# Patient Record
Sex: FEMALE | Race: OTHER | NOT HISPANIC OR LATINO | URBAN - METROPOLITAN AREA
[De-identification: names, ages, dates, MRNs, and addresses within clinical notes are randomized per-mention and may not be internally consistent; named-entity substitution may affect disease eponyms.]

---

## 2019-03-27 PROBLEM — Z00.00 ENCOUNTER FOR PREVENTIVE HEALTH EXAMINATION: Status: ACTIVE | Noted: 2019-03-27

## 2019-04-05 ENCOUNTER — OUTPATIENT (OUTPATIENT)
Dept: OUTPATIENT SERVICES | Facility: HOSPITAL | Age: 49
LOS: 1 days | End: 2019-04-05
Payer: COMMERCIAL

## 2019-04-05 ENCOUNTER — APPOINTMENT (OUTPATIENT)
Dept: MRI IMAGING | Facility: CLINIC | Age: 49
End: 2019-04-05

## 2019-04-05 PROCEDURE — 70553 MRI BRAIN STEM W/O & W/DYE: CPT | Mod: 26

## 2019-04-05 PROCEDURE — 70544 MR ANGIOGRAPHY HEAD W/O DYE: CPT | Mod: 26,59

## 2019-04-24 PROBLEM — Z00.00 ENCOUNTER FOR PREVENTIVE HEALTH EXAMINATION: Noted: 2019-04-24

## 2019-04-26 ENCOUNTER — OUTPATIENT (OUTPATIENT)
Dept: OUTPATIENT SERVICES | Facility: HOSPITAL | Age: 49
LOS: 1 days | End: 2019-04-26

## 2019-04-26 ENCOUNTER — APPOINTMENT (OUTPATIENT)
Dept: MRI IMAGING | Facility: CLINIC | Age: 49
End: 2019-04-26
Payer: COMMERCIAL

## 2019-04-26 PROCEDURE — 70547 MR ANGIOGRAPHY NECK W/O DYE: CPT | Mod: 26

## 2019-05-17 ENCOUNTER — OUTPATIENT (OUTPATIENT)
Dept: OUTPATIENT SERVICES | Facility: HOSPITAL | Age: 49
LOS: 1 days | End: 2019-05-17

## 2019-05-17 ENCOUNTER — APPOINTMENT (OUTPATIENT)
Dept: CT IMAGING | Facility: CLINIC | Age: 49
End: 2019-05-17
Payer: COMMERCIAL

## 2019-05-17 PROCEDURE — 70498 CT ANGIOGRAPHY NECK: CPT | Mod: 26

## 2019-06-11 ENCOUNTER — APPOINTMENT (OUTPATIENT)
Dept: NEUROSURGERY | Facility: CLINIC | Age: 49
End: 2019-06-11
Payer: COMMERCIAL

## 2019-06-11 VITALS
OXYGEN SATURATION: 99 % | RESPIRATION RATE: 18 BRPM | DIASTOLIC BLOOD PRESSURE: 77 MMHG | SYSTOLIC BLOOD PRESSURE: 111 MMHG | BODY MASS INDEX: 32.78 KG/M2 | WEIGHT: 192 LBS | HEART RATE: 63 BPM | HEIGHT: 64 IN

## 2019-06-11 DIAGNOSIS — Z86.69 PERSONAL HISTORY OF OTHER DISEASES OF THE NERVOUS SYSTEM AND SENSE ORGANS: ICD-10-CM

## 2019-06-11 DIAGNOSIS — F41.9 ANXIETY DISORDER, UNSPECIFIED: ICD-10-CM

## 2019-06-11 DIAGNOSIS — Z83.3 FAMILY HISTORY OF DIABETES MELLITUS: ICD-10-CM

## 2019-06-11 DIAGNOSIS — Q27.9 CONGENITAL MALFORMATION OF PERIPHERAL VASCULAR SYSTEM, UNSPECIFIED: ICD-10-CM

## 2019-06-11 DIAGNOSIS — Z78.9 OTHER SPECIFIED HEALTH STATUS: ICD-10-CM

## 2019-06-11 DIAGNOSIS — Z82.49 FAMILY HISTORY OF ISCHEMIC HEART DISEASE AND OTHER DISEASES OF THE CIRCULATORY SYSTEM: ICD-10-CM

## 2019-06-11 DIAGNOSIS — F32.9 ANXIETY DISORDER, UNSPECIFIED: ICD-10-CM

## 2019-06-11 DIAGNOSIS — Z86.39 PERSONAL HISTORY OF OTHER ENDOCRINE, NUTRITIONAL AND METABOLIC DISEASE: ICD-10-CM

## 2019-06-11 PROCEDURE — 99204 OFFICE O/P NEW MOD 45 MIN: CPT

## 2019-06-11 RX ORDER — TOPIRAMATE 25 MG/1
25 TABLET, COATED ORAL
Refills: 0 | Status: ACTIVE | COMMUNITY

## 2019-06-11 RX ORDER — ATORVASTATIN CALCIUM 10 MG/1
10 TABLET, FILM COATED ORAL
Refills: 0 | Status: ACTIVE | COMMUNITY

## 2019-06-11 RX ORDER — METFORMIN HYDROCHLORIDE 500 MG/1
500 TABLET, FILM COATED, EXTENDED RELEASE ORAL
Refills: 0 | Status: ACTIVE | COMMUNITY

## 2019-06-11 RX ORDER — LIRAGLUTIDE 6 MG/ML
INJECTION, SOLUTION SUBCUTANEOUS
Refills: 0 | Status: ACTIVE | COMMUNITY

## 2019-06-11 NOTE — HISTORY OF PRESENT ILLNESS
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 She experience vertigo once in February that lasted for 2 weeks. She also complains of RIGHT intermittent periorbital HA that is managed by her neurologist with Topamax. CTA mentions of the right VA showing a fusiform dilatation and ectasia at the v3 segment which is nonspecific. \par \par She denies recent neck/head trauma, focal weakness, numbness/tingling, unsteady gait. She denies severe new onset headache and family history of brain aneurysm\par \par Handedness: RIGHT\par \par Patient Address:\par [address], [address]\[place] [address]\par \par Referring MD:\par Dr. Ike Peoples\par [address]\[place] [address]\par [phone]\par \par PCP:\par Dr. Anjelica Ascencio\par [address], [address]\par [facility] [address]\par [phone]\par \par \par PCP:

## 2019-06-11 NOTE — REASON FOR VISIT
[New Patient Visit] : a new patient visit [Referred By: _________] : Patient was referred by MACARIO [FreeTextEntry1] : to evaluate aplastic LEFT vertebral artery

## 2019-06-11 NOTE — REVIEW OF SYSTEMS
[Anxiety] : anxiety [As Noted in HPI] : as noted in HPI [Feeling Tired] : feeling tired [Negative] : Musculoskeletal

## 2019-06-11 NOTE — DATA REVIEWED
[de-identified] : \par EXAM: MR ANGIO NECK \par \par PROCEDURE DATE: 04/26/2019 \par \par \par \par \par INTERPRETATION: PROCEDURE: MRA neck without contrast \par \par INDICATION: Vascular headache, vertigo. Abnormal left vertebral artery. \par \par TECHNIQUE: The MRA of the neck was performed utilizing both 2-D and 3-D TOF \par technique. MIP series are provided. \par \par COMPARISON: MRA brain 04/05/2019 \par \par FINDINGS: The left vertebral artery is not identified med may be markedly \par hypoplastic, congenitally absent or chronically occluded. As recommended on \par MRA of the head, CTA of the neck is recommended as the best study to \par evaluate the vertebral artery along its course. \par \par Both common carotid and internal carotid arteries are widely patent and \par normal caliber throughout the neck. The right vertebral artery is widely \par patent and normal caliber throughout the neck, of similar size to both \par internal carotid arteries, likely supplying bilateral posterior circulation. \par \par \par IMPRESSION: \par \par No flow detected in the left vertebral artery. This could be developmental \par (aplasia or severe hypoplasia) or acquired in the setting of remote \par occlusion. As was previously recommended, CTA neck would be best to evaluate \par given its superior spatial resolution. \par \par \par \par \par \par \par \par \par JACQUES LIMON M.D. ATTENDING RADIOLOGIST \par This document has been electronically signed. Apr 27 2019 12:36PM \par \par \par \par \par \par \par    \par  \par  \par \par \par Bookmarks; \par EXAM: MR ANGIO BRAIN \par \par PROCEDURE DATE: 04/05/2019 \par \par \par \par \par INTERPRETATION: Headache and vertigo. \par \par Technique: MRA of the intracranial circulation was performed using 3-D time \par of flight technique. \par \par Findings: Evaluation of the anterior circulation demonstrates normal course \par and caliber of the distal internal carotid arteries. There is a normal \par appearance to the bilateral anterior cerebral and middle cerebral arteries. \par \par Evaluation the posterior circulation demonstrates a dominant right vertebral \par artery. The left vertebral artery is not seen. There is a normal appearance \par to the basilar artery. The bilateral posterior cerebral arteries are also \par within normal limits. \par \par There is no evidence of aneurysm or stenosis . \par \par Impression: Left vertebral artery is not seen and may be hypoplastic. If \par there is further clinical concern, would recommend CTA to evaluate the full \par course vertebral artery \par \par \par \par \par \par \par \par \par MICHELE VIRGEN M.D., ATTENDING RADIOLOGIST \par This document has been electronically signed. Apr 7 2019 8:09PM \par \par \par \par \par \par \par    \par  \par  \par \par \par Bookmarks [de-identified] : EXAM: MR BRAIN WAW IC \par \par PROCEDURE DATE: 04/05/2019 \par \par \par \par \par INTERPRETATION: Vertigo. \par \par \par Technique: MRI OF THE BRAIN AND INTERNAL AUDITORY CANALS (WITH GADAVIST): \par \par High-resolution magnetic resonance imaging of the internal auditory canals \par was performed with thin section T1-weighted axial and coronal images before \par and after the administration of 8 cc of Gadavist. Routine T1-weighted \par sagittal images,FLAIR axial sagittal and coronal reformatted images, fast \par spin echo T2- weighted axial images, diffusion-weighted axial images, and \par post-contrast T1-weighted axial images were performed of the brain. \par High-resolution T2-weighted images through the IACs were performed. \par \par MRI OF THE SELLA: Dedicated pre and post sagittal and coronal T1 weighted \par images through the sella were. Coronal T2-weighted images were also \par performed. \par \par \par There is no evidence of abnormal contrast enhancement or mass in the \par internal auditory canals. No cerebellopontine angle masses are identified. \par \par The sella is normal in size and configuration. There is a mild asymmetric \par appearance to the superior aspect of the pituitary gland with the left \par slightly larger than the right. There is deviation of infundibulum to the \par left. The pituitary gland demonstrates no evidence of differential \par enhancement. The cavernous sinus appears normal. \par \par There is a small approximately 1.7 cm by a 0.8 cm x 0.4 cm ovoid focus of T2 \par and Flair signal hyperintensity within the left frontal periventricular \par white matter that are nonspecific. There is no evidence of mass-effect or \par midline shift. There is no evidence of intra or extra-axial fluid \par collection. The ventricles are of normal size and caliber. There is no \par evidence of acute infarction. The right vertebral artery is dominant. The \par left V4 segment of the vertebral artery is not seen. Evaluation of the \par remaining intracranial vascular flow-voids appear within normal limits. \par \par The visualized paranasal sinuses are clear. \par \par There is moderate prominence of the nasopharyngeal soft tissues; findings \par may represent infection/inflammation, HIV or neoplasm. If there is further \par clinical concern, would recommend direct inspection and if clinically \par indicated tissue sampling. Also there is marked prominence of the palatine \par tonsils that are contacting at the midline. There are scattered partially \par opacified bilateral mastoid air cells, left greater than right consistent \par with mild to moderate mastoiditis versus effusions. \par \par \par Impression: No evidence of vestibular schwannoma. No evidence of a pituitary \par adenoma. \par \par Nonspecific approximately 1.7 cm ovoid foci of T2 and Flair signal \par hyperintensity within the left frontal periventricular white matter; \par findings may be seen in patient with migraine headaches, vasculitis, \par demyelinating disease, Lyme disease and viral illness. No evidence of acute \par infarction. \par \par \par Moderate prominence of the nasopharyngeal soft tissues; findings may \par represent infection/inflammation, HIV or neoplasm. If there is further \par clinical concern, would recommend direct inspection and if clinically \par indicated tissue sampling. \par \par Marked prominence of the palatine tonsils that are contacting at the midline. \par \par Mild right and moderate left mastoiditis versus effusions. \par \par \par \par \par \par \par \par \par \par  [de-identified] : EXAM: CT ANGIO NECK (W)AW IC \par \par PROCEDURE DATE: 05/17/2019 \par \par \par \par \par INTERPRETATION: PROCEDURE: CTA neck with intravenous contrast. \par \par INDICATION: Vertigo. Left vertebral artery hypoplasia or occlusion \par \par TECHNIQUE: Multiple axial thin section were obtained through the [place] of \par [place] following the intravenous bolus injection of 95 cc Omnipaque 350. \par MIP series are provided. \par \par COMPARISON: MRA neck 04/26/2019 \par \par FINDINGS: \par \par The left vertebral artery is not identified at its origin. The transverse \par foramen of the cervical spine are very small and appearance is compatible \par with atresia/aplasia on the left vertebral artery in the neck. \par Intracranially, the left PICA is opacified with contrast as is a small \par post-PICA left V4 segment. Presumably this is filling from the right side, \par since no pre-PICA left vertebral artery is identified. \par \par As on the other prior imaging, both common carotid and internal carotid \par arteries are widely patent in the neck without stenosis, occlusion or \par dissection injury. \par \par The right vertebral artery is dominant, and shows fusiform dilatation, \par measuring 7 mm diameter. There is no pseudoaneurysm or filling defect. This \par is likely secondary to hyperdynamic state, but may be present in the setting \par of remote dissection injury versus underlying connective tissue disorder. \par \par The aortic arch is normal caliber. \par \par \par IMPRESSION: \par \par 1. 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 \par \par 2. The right vertebral artery shows fusiform dilatation and ectasia at its \par V3 segment which is nonspecific and could possibly be sequela of remote \par dissection or acquired in the setting of a connective tissue disorder \par superimposed on a high-flow state. No pseudoaneurysm or filling defect. \par \par 3. No steno-occlusive disease of either carotid artery. \par \par \par \par \par \par \par \par \par JACQUES LIMON M.D. ATTENDING RADIOLOGIST \par This document has been electronically signed. May 21 2019 3:44PM \par \par

## 2019-06-11 NOTE — PHYSICAL EXAM
[Oriented To Time, Place, And Person] : oriented to person, place, and time [General Appearance - In No Acute Distress] : in no acute distress [General Appearance - Alert] : alert [Impaired Insight] : insight and judgment were intact [Place] : oriented to place [Person] : oriented to person [Cranial Nerves Optic (II)] : visual acuity intact bilaterally,  pupils equal round and reactive to light [Time] : oriented to time [Cranial Nerves Oculomotor (III)] : extraocular motion intact [Cranial Nerves Facial (VII)] : face symmetrical [Cranial Nerves Trigeminal (V)] : facial sensation intact symmetrically [Cranial Nerves Vestibulocochlear (VIII)] : hearing was intact bilaterally [Cranial Nerves Glossopharyngeal (IX)] : tongue and palate midline [Cranial Nerves Accessory (XI - Cranial And Spinal)] : head turning and shoulder shrug symmetric [Balance] : balance was intact [Cranial Nerves Hypoglossal (XII)] : there was no tongue deviation with protrusion [PERRL With Normal Accommodation] : pupils were equal in size, round, reactive to light, with normal accommodation [Neck Appearance] : the appearance of the neck was normal [Full Visual Field] : full visual field [Exaggerated Use Of Accessory Muscles For Inspiration] : no accessory muscle use [] : no respiratory distress [Respiration, Rhythm And Depth] : normal respiratory rhythm and effort [Heart Rate And Rhythm] : heart rate was normal and rhythm regular [Heart Sounds] : normal S1 and S2 [Auscultation Breath Sounds / Voice Sounds] : lungs were clear to auscultation bilaterally [Apical Impulse] : the apical impulse was normal [Skin Color & Pigmentation] : normal skin color and pigmentation [Abnormal Walk] : normal gait [FreeTextEntry6] : 5/5 on all four extremities

## 2019-06-11 NOTE — ASSESSMENT
[FreeTextEntry1] : MRI, MRA and CTA of the head and neck from April 2019 were reviewed.  There is no evidence of ischemic stroke.  There is a focus of non-specific hyperintensity in the left subcortical periventricular white matter.  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  \par \par The findings are most likely congenital and unrelated to her symptoms.  She was reassured.  A follow up MRA of the brain and neck is recommended in 2 years.  \par \par Reassured patient that vertigo and headaches have nothing to do with radiology findings.\par \par

## 2019-06-11 NOTE — PLAN
[FreeTextEntry1] : Plan:\par 1. MRA head and neck without contrast in 2 years. Advised patient to call the office 1 month prior to schedule the exam.\par 2. Vertigo and headaches have nothing to do with radiology findings

## 2019-08-23 ENCOUNTER — APPOINTMENT (OUTPATIENT)
Dept: MRI IMAGING | Facility: CLINIC | Age: 49
End: 2019-08-23
Payer: COMMERCIAL

## 2019-08-23 ENCOUNTER — OUTPATIENT (OUTPATIENT)
Dept: OUTPATIENT SERVICES | Facility: HOSPITAL | Age: 49
LOS: 1 days | End: 2019-08-23

## 2019-08-23 PROCEDURE — 70551 MRI BRAIN STEM W/O DYE: CPT | Mod: 26
